# Patient Record
Sex: MALE | Race: WHITE | NOT HISPANIC OR LATINO | Employment: FULL TIME | URBAN - METROPOLITAN AREA
[De-identification: names, ages, dates, MRNs, and addresses within clinical notes are randomized per-mention and may not be internally consistent; named-entity substitution may affect disease eponyms.]

---

## 2017-01-06 ENCOUNTER — HOSPITAL ENCOUNTER (EMERGENCY)
Facility: HOSPITAL | Age: 27
Discharge: HOME | End: 2017-01-06
Attending: INTERNAL MEDICINE

## 2017-01-06 VITALS
WEIGHT: 170 LBS | OXYGEN SATURATION: 98 % | DIASTOLIC BLOOD PRESSURE: 75 MMHG | RESPIRATION RATE: 17 BRPM | TEMPERATURE: 97 F | HEART RATE: 84 BPM | BODY MASS INDEX: 24.34 KG/M2 | HEIGHT: 70 IN | SYSTOLIC BLOOD PRESSURE: 120 MMHG

## 2017-01-06 DIAGNOSIS — T50.904A DRUG OVERDOSE, UNDETERMINED INTENT, INITIAL ENCOUNTER: Primary | ICD-10-CM

## 2017-01-06 PROBLEM — T50.901A DRUG OVERDOSE: Status: ACTIVE | Noted: 2017-01-06

## 2017-01-06 LAB
AMPHET+METHAMPHET UR QL: NEGATIVE
APAP SERPL-MCNC: <3 UG/ML
BARBITURATES UR QL SCN>200 NG/ML: NEGATIVE
BENZODIAZ UR QL SCN>200 NG/ML: NEGATIVE
BZE UR QL SCN: NEGATIVE
CANNABINOIDS UR QL SCN: NORMAL
CREAT UR-MCNC: 153.8 MG/DL
ETHANOL SERPL-MCNC: <10 MG/DL
METHADONE UR QL SCN>300 NG/ML: NEGATIVE
OPIATES UR QL SCN: NEGATIVE
PCP UR QL SCN>25 NG/ML: NEGATIVE
SALICYLATES SERPL-MCNC: <5 MG/DL
TOXICOLOGY INFORMATION: NORMAL

## 2017-01-06 PROCEDURE — 99284 EMERGENCY DEPT VISIT MOD MDM: CPT | Mod: 25

## 2017-01-06 PROCEDURE — 80320 DRUG SCREEN QUANTALCOHOLS: CPT

## 2017-01-06 PROCEDURE — 25000003 PHARM REV CODE 250: Performed by: INTERNAL MEDICINE

## 2017-01-06 PROCEDURE — 80307 DRUG TEST PRSMV CHEM ANLYZR: CPT

## 2017-01-06 PROCEDURE — 82570 ASSAY OF URINE CREATININE: CPT

## 2017-01-06 PROCEDURE — 96360 HYDRATION IV INFUSION INIT: CPT

## 2017-01-06 PROCEDURE — 80329 ANALGESICS NON-OPIOID 1 OR 2: CPT

## 2017-01-06 RX ORDER — SODIUM CHLORIDE 9 MG/ML
1000 INJECTION, SOLUTION INTRAVENOUS
Status: COMPLETED | OUTPATIENT
Start: 2017-01-06 | End: 2017-01-06

## 2017-01-06 RX ADMIN — SODIUM CHLORIDE 1000 ML: 0.9 INJECTION, SOLUTION INTRAVENOUS at 09:01

## 2017-01-06 NOTE — LETTER
January 22, 2017    Haile France  [address] [place] [address]             [facility] -   [address]  [facility] [address]  Phone: [phone]  Fax: [phone] Dear Mr. France:        If you have any questions or concerns, please don't hesitate to call.    Sincerely,        John Orozco MD

## 2017-01-06 NOTE — ED AVS SNAPSHOT
"          [facility]  [address] [address]               Haile France   2017  8:44 PM   ED    Description:  Male : 1990   Department:  [facility]           Your Care was Coordinated By:     Provider Role From To    John Orozco MD Attending Provider 17 0022 --      Reason for Visit     Drug Overdose           Diagnoses this Visit        Comments    Drug overdose, undetermined intent, initial encounter    -  Primary       ED Disposition     None           To Do List           [facility]     [facility] -  Assistance  Registered nurses in the [facility] provide clinical advisement, health education, appointment booking, and other advisory services.  Call for this free service at [phone].             Medications           Message regarding Medications     Verify the changes and/or additions to your medication regime listed below are the same as discussed with your clinician today.  If any of these changes or additions are incorrect, please notify your healthcare provider.        These medications were administered today        Dose Freq    0.9%  NaCl infusion 1,000 mL ED 1 Time    Sig: Inject 1,000 mLs into the vein ED 1 Time.    Class: Normal    Route: Intravenous           Verify that the below list of medications is an accurate representation of the medications you are currently taking.  If none reported, the list may be blank. If incorrect, please contact your healthcare provider. Carry this list with you in case of emergency.           Current Medications     naproxen (NAPROSYN) 500 MG tablet Take 1 tablet (500 mg total) by mouth 2 (two) times daily with meals.           Clinical Reference Information           Your Vitals Were     BP Pulse Temp Resp Height Weight    120/75 (BP Location: Right arm) 84 97.3 °F (36.3 °C) (Oral) 17 5' 10" (1.778 m) 77.1 kg (170 lb)    " SpO2 BMI             98% 24.39 kg/m2         Allergies as of 1/6/2017     No Known Allergies      Immunizations Administered on Date of Encounter - 1/6/2017     None      ED Micro, Lab, POCT     Start Ordered       Status Ordering Provider    01/06/17 2047 01/06/17 2047  Drug screen panel, emergency  STAT      Final result     01/06/17 2047 01/06/17 2047  Ethanol  Once      Final result     01/06/17 2047 01/06/17 2047  Acetaminophen level  Once      Final result     01/06/17 2047 01/06/17 2047  Salicylate level  Once      Final result       ED Imaging Orders     None      Discharge References/Attachments     DRUG ABUSE (ENGLISH)      [facility] Sign-Up     Activating your [facility] account is as easy as 1-2-3!     1) Visit [email or web address], select Sign Up Now, enter this activation code and your date of birth, then select Next.  [email or web address]  Expires: 2/20/2017 10:41 PM      2) Create a username and password to use when you visit [facility] in the future and select a security question in case you lose your password and select Next.    3) Enter your e-mail address and click Sign Up!    Additional Information  If you have questions, please e-mail [email or web address] or call [phone] to talk to our [facility] staff. Remember, [facility] is NOT to be used for urgent needs. For medical emergencies, dial 911.         Smoking Cessation     If you would like to quit smoking:   You may be eligible for free services if you are a [place] resident and started smoking cigarettes before September 1, 1988.  Call the [employer] ([employer]) toll free at (180) 718-7580 or (290) 205-9579.   Call 2-800-QUIT-NOW if you do not meet the above criteria.             [facility]  complies with applicable Federal civil rights laws and does not discriminate on the basis of race, color, national origin, age, disability, or sex.        Language Assistance Services     ATTENTION: Language assistance services are  available, free of charge. Please call [phone].      ATENCIÓN: Si habla español, tiene a jung disposición servicios gratuitos de asistencia lingüística. Llame al [phone].     CHÚ Ý: N?u b?n nói Ti?ng Vi?t, có các d?ch v? h? tr? ngôn ng? mi?n phí dành cho b?n. G?i s? [phone].

## 2017-01-07 NOTE — ED NOTES
Pt to Ed from home where ray called 911 due to pt being unresponsive, pt reports taking 1 Roxycontin and going to sleep. Patient denies other drug/alcohol use at this time.

## 2017-02-10 ENCOUNTER — HOSPITAL ENCOUNTER (EMERGENCY)
Facility: HOSPITAL | Age: 27
Discharge: HOME | End: 2017-02-11
Attending: EMERGENCY MEDICINE

## 2017-02-10 DIAGNOSIS — R09.02 HYPOXIA: ICD-10-CM

## 2017-02-10 DIAGNOSIS — F11.129 OPIOID ABUSE WITH INTOXICATION: Primary | ICD-10-CM

## 2017-02-10 DIAGNOSIS — F19.10 POLYSUBSTANCE ABUSE: ICD-10-CM

## 2017-02-10 LAB
ALBUMIN SERPL BCP-MCNC: 4 G/DL
ALP SERPL-CCNC: 83 U/L
ALT SERPL W/O P-5'-P-CCNC: 17 U/L
ANION GAP SERPL CALC-SCNC: 13 MMOL/L
APAP SERPL-MCNC: <3 UG/ML
AST SERPL-CCNC: 17 U/L
BILIRUB SERPL-MCNC: 0.2 MG/DL
BUN SERPL-MCNC: 21 MG/DL
CALCIUM SERPL-MCNC: 8.6 MG/DL
CHLORIDE SERPL-SCNC: 103 MMOL/L
CO2 SERPL-SCNC: 24 MMOL/L
CREAT SERPL-MCNC: 1.2 MG/DL
EST. GFR  (AFRICAN AMERICAN): >60 ML/MIN/1.73 M^2
EST. GFR  (NON AFRICAN AMERICAN): >60 ML/MIN/1.73 M^2
ETHANOL SERPL-MCNC: <10 MG/DL
GLUCOSE SERPL-MCNC: 234 MG/DL
POTASSIUM SERPL-SCNC: 4.6 MMOL/L
PROT SERPL-MCNC: 7 G/DL
SODIUM SERPL-SCNC: 140 MMOL/L
TSH SERPL DL<=0.005 MIU/L-ACNC: 0.61 UIU/ML

## 2017-02-10 PROCEDURE — 80053 COMPREHEN METABOLIC PANEL: CPT

## 2017-02-10 PROCEDURE — 82570 ASSAY OF URINE CREATININE: CPT

## 2017-02-10 PROCEDURE — 85027 COMPLETE CBC AUTOMATED: CPT

## 2017-02-10 PROCEDURE — 80320 DRUG SCREEN QUANTALCOHOLS: CPT

## 2017-02-10 PROCEDURE — 80329 ANALGESICS NON-OPIOID 1 OR 2: CPT

## 2017-02-10 PROCEDURE — 99284 EMERGENCY DEPT VISIT MOD MDM: CPT

## 2017-02-10 PROCEDURE — 85007 BL SMEAR W/DIFF WBC COUNT: CPT

## 2017-02-10 PROCEDURE — 81000 URINALYSIS NONAUTO W/SCOPE: CPT

## 2017-02-10 PROCEDURE — 84443 ASSAY THYROID STIM HORMONE: CPT

## 2017-02-10 NOTE — ED AVS SNAPSHOT
[facility]  [address] [facility] [address]               Haile France   2/10/2017 10:21 PM   ED    Description:  Male : 1990   Department:  [facility]           Your Care was Coordinated By:     Provider Role From To    Yeison Lu Jr., MD Attending Provider 02/10/17 2224 --      Reason for Visit     Drug / Alcohol Assessment           Diagnoses this Visit        Comments    Opioid abuse with intoxication    -  Primary     Polysubstance abuse         Hypoxia           ED Disposition     ED Disposition Condition Comment    Discharge  Patient was advised that the treatment of chronic pain, as opposed to the treatment of acute pain, was beyond my scope of practice and was considered a non-emergent, not life threatening, condition. As per LAC 46:XLVII.4513.D.2.b, the practitioner will n ot prescribe controlled substances in connection with the treatment of chronic or intractable pain defined by LAC 46:XLV.5522-2294, as pain which persists beyond the usual course of a disease, beyond the expected time for healing from bodily trauma, or p ain associated with a long-term incurable or intractable medical illness and/or disease. Furthermore, the patient was advised that pursuant to [place] §971, it is considered unlawful for an individual to knowingly or intentionally acquire or obtain possess ion of a controlled dangerous substance by misrepresentation, fraud, forgery, deception, or subterfuge; to obtain or attempt to obtain a prescription for a controlled substance and/or legend drug by fraud, theft, misrepresentation, deception or subterfug e; or obtain or seek to obtain any controlled dangerous substance or a prescription for a controlled dangerous substance from a healthcare practitioner, while being supplied with any controlled dangerous substance or a prescription for a controlled dange kolby substance by another healthcare  practitioner, without disclosing the fact of the existing prescription to the practitioner from whom the subsequent prescription for a controlled dangerous substance is sought.     Regarding DRUG ADDICTION, the martyen t was advised that:  an overdose of controlled substances, illegal drugs, or narcotics, can lead to a coma and death; HIV, AIDS, and hepatitis B and C are serious diseases that can be spread through needles, syringes, and other supplies used to inject na rcotics; one may also get an infection if injecting narcotics using dirty needles or supplies; illegal narcotics may be mixed with things like talcum powder, baking soda, or poisons that may get stuck in veins and cause infections or clots that may trave l to the heart, lungs, or brain and cause death; snorting or sniffing heroin may cause a hole to develop in the cartilage that separates the two sides of the nose; using pure heroin  increases the risks of an overdose and death; and black tar heroin may  contain the bacteria that causes botulism which can be life-threatening.             To Do List           Follow-up Information     Follow up with [facility]. Schedule an appointment as soon as possible for a visit in 1 week.    Contact information:    [address] [facility] [address] [phone]          Follow up with [facility]    Specialty:  Emergency Medicine    Why:  As needed, If symptoms worsen    Contact information:    [address] [facility] [address] [phone]      [facility]     [facility] - 24/7 Assistance  Registered nurses in the [facility] provide clinical advisement, health education, appointment booking, and other advisory services.  Call for this free service at [phone].             Medications           Message regarding Medications     Verify the changes and/or additions to your medication regime listed below  "are the same as discussed with your clinician today.  If any of these changes or additions are incorrect, please notify your healthcare provider.        STOP taking these medications     naproxen (NAPROSYN) 500 MG tablet Take 1 tablet (500 mg total) by mouth 2 (two) times daily with meals.           Verify that the below list of medications is an accurate representation of the medications you are currently taking.  If none reported, the list may be blank. If incorrect, please contact your healthcare provider. Carry this list with you in case of emergency.                Clinical Reference Information           Your Vitals Were     BP Pulse Temp Resp Height Weight    104/67 94 96.1 °F (35.6 °C) (Axillary) 14 5' 8" (1.727 m) 85.7 kg (189 lb)    SpO2 BMI             96% 28.74 kg/m2         Allergies as of 2/11/2017     No Known Allergies      Immunizations Administered on Date of Encounter - 2/11/2017     None      ED Micro, Lab, POCT     Start Ordered       Status Ordering Provider    02/10/17 2240 02/10/17 2239  CBC auto differential  STAT      Final result     02/10/17 2240 02/10/17 2239  Comprehensive metabolic panel  STAT      Final result     02/10/17 2240 02/10/17 2239  TSH  STAT      Final result     02/10/17 2240 02/10/17 2239  Urinalysis - clean catch  STAT      Final result     02/10/17 2240 02/10/17 2239  Drug screen panel, emergency  STAT      Final result     02/10/17 2240 02/10/17 2239  Ethanol  STAT      Final result     02/10/17 2240 02/10/17 2239  Acetaminophen level  STAT      Final result     02/10/17 2239 02/10/17 2239  Urinalysis Microscopic  Once      Final result       ED Imaging Orders     None        Discharge Instructions         Alcohol Addiction  Are you addicted to alcohol?    You may be addicted to alcohol if your drinking harms yourself or others or leads to other problems with your daily life.  The medical term for this is alcohol use disorder. Your health care provider may diagnose you " with this disorder if you have at least two of the following problems within the span of a year:  · You drink alcohol in larger amounts or for a longer period than you intended.  · You frequently want to cut down or control alcohol use, or have frequently failed efforts to do so.  · You spend a lot of time getting alcohol, using alcohol, or recovering from alcohol use.  · You crave or have a strong desire or urge to drink alcohol.  · Ongoing alcohol use makes it hard for you to be responsible at work, school, or home.  · You continue to use alcohol even though you have had problems in relationships or social settings because of it.  · You give up or miss important social, work, or recreational activities because of alcohol use.  · You drink alcohol in situations in which it is physically unsafe, such as driving while intoxicated.  · You continue to drink alcohol despite knowing it has caused physical or emotional problems.  · You need more and more alcohol to get the same effects.  · You have withdrawal symptoms or use alcohol to avoid withdrawal symptoms.  Date Last Reviewed: 11/11/2014  © 8092-5466 [employer]. [address] [place] [address]. All rights reserved. This information is not intended as a substitute for professional medical care. Always follow your healthcare professional's instructions.          Signs of Addiction: Social Use  Not everyone who takes a drink or tries a drug has a substance abuse problem. But for some people, what starts as social use can lead to problem use (abuse) and then addiction. Some people can have a drink or use another substance and then stop. They dont think about drinking or using again for a while. For some this will never lead to heavier use. For others, it may.    You can take it or leave it  · You have a drink at a party or a glass of wine with dinner.  · You tried a street drug a few times, but have no interest in using it repeatedly.   · You  take medications only when needed and only as directed.  · You dont think about using or plan the next time youll use.  Using is still a choice  A social user can choose to say yes or no at any time. This person is in control of his or her substance use. Think about these statements:  · I can put down a drink without finishing it.  · I can enjoy myself at a social event without using.  · I can go without using or thinking about using.  If this describes you, youre most likely a social user. But ask yourself, can you really take it or leave it? You might try for a few weeks or even a month. If you find it hard or you just cant do it, maybe you need to think about your use again.  Date Last Reviewed: 11/11/2014 © 2000-2016 [employer]. [address] [place] [address]. All rights reserved. This information is not intended as a substitute for professional medical care. Always follow your healthcare professional's instructions.          Recovering from Addiction: Coping with Relapse  Drug and alcohol abuse changes the brain in ways that continue long after the abuse ends. Because of this, people who are addicted to drugs or alcohol are at risk for relapse. This is true even after long periods of staying drug- or alcohol-free. Like other chronic diseases, substance addiction needs to be managed daily. A person who is addicted to drugs or alcohol needs a plan to help prevent, or manage, a relapse.  You will need ongoing treatment and support. Your best chance for long-term recovery will likely be a combination of medicines and behavioral therapy. Other tips include:  Stick with your treatment plan. It may seem like you've recovered and you don't need to keep taking steps to stay drug- or alcohol-free. Your chances of staying sober are much higher if you continue treatment after you recover.  Get help immediately if you use the drug again. If you start using again, talk with your health care  provider or someone else who can help you right away.  Get loved ones involved in your recovery. A form of therapy called community reinforcement and family training focuses on counseling and training for your family. The therapist teaches your family how to help motivate you to seek treatment. This therapy also helps the family recognize situations that may lead you to drink or use drugs. Other family oriented recovery programs, such as Alcoholics Anonymous and Al-Anon, are available in communities nationwide.  Learn healthy ways to cope with stress, anger, boredom, or other triggers. Behavioral therapy can help you learn ways for coping with drug or alcohol cravings. It can also teach you ways to avoid drugs and prevent relapse, and help you deal with relapse if it occurs.   Date Last Reviewed: 11/12/2014 © 2000-2016 [employer]. [address] [place] [address], [place] [address]. All rights reserved. This information is not intended as a substitute for professional medical care. Always follow your healthcare professional's instructions.          Addiction: Ask Yourself These Questions  Ask yourself the following questions. The answers can help you see where you might have problems caused by substance abuse. Then you can decide whether youre ready to do something about your use.  Yes No    ? ? Do you ever have trouble remembering things or concentrating on what youre doing because youre thinking about using?   ? ? Have you ever broken promises because of your substance use?   ? ? Have you ever done things when you were using that you wouldnt normally do?   ? ? Have you ever lost a job or had money troubles because of your use?   ? ? Do you ever make excuses for your use or lie to hide it?   ? ? Does someone make excuses for you when youre hung over?   ? ? Do others ever seem embarrassed about your use?   ? ? Have your children ever asked you to stop using or been afraid of you when you are using?   ? ?  Has your spouse or a girlfriend or boyfriend ever left you because of your use?   ? ? Have you ever had sexual problems that might be caused by your use?   ? ? Have you ever had severe shaking, heard voices, or thought you were seeing things after youve been using?   ? ? Have you noticed that youve gotten sick more often as you use more?   ? ? Have you tried to quit but found you just couldnt?   If you answer Yes to one or more of these questions, you may need to change or stop your substance use.   Date Last Reviewed: 6/1/2016 © 2000-2016 [employer]. [address] [place] [address], [place] [address]. All rights reserved. This information is not intended as a substitute for professional medical care. Always follow your healthcare professional's instructions.          Opiate Abuse  Use and abuse of heroin or prescription pain medicines (Vicodin, Percocet, oxycodone, OxyContin, codeine, etc.) may lead to addiction or dependence. Once this occurs, you are at greater risk for any of these:  · Craving for the drug and unable to stop using the drug even though you think you want to stop (psychological addiction)  · Drug withdrawal symptoms if you stop taking the drug (physical dependence)  · Loss of your job or your family  · Arrest, conviction, and FDC sentence for possession of an illegal substance or for driving under the influence of such a substance  · Accidental injuries to yourself or others while you are under the influence of the drug in a car or at home  · Death or serious injury from overdose  Health problems  The list of potential health problems is a long one. It can be different with different medicines. They can cause problems even if you have no history of medical problems. It is also affected by other medicines you may be taking, and chronic illnesses you may have. Besides the problems listed above, abuse also has other effects, some directly related to the drugs, others from or related to  addiction or dependency.  · Anxiety  · Seizures  · Constipation  · Hepatitis (liver infection)  · Liver failure  · Blood pressure problems  · Depression  · Insomnia  · Nausea, vomiting, and stomach problems  · Drowsiness  · Slurred speech  · Trouble breathing  · Dizziness  · Skin infections  · Muscle pain and spasms  · Stroke  · Heart attack  · Kidney failure  · HIV infection  · Skin infections  · Other sexually transmitted diseases  · Severe and fatal infection of the heart valves  · Coma and death  Home care  · Admit you have a drug problem. Ask for help from your family and close friends.  · Seek professional help. This could be individual psychotherapy, counseling, or a drug treatment program (outpatient or residential).  · Join a self-help group for drug abuse.  · Avoid friends who abuse drugs themselves or tempt you to continue your habit.  · Eat a balanced diet and begin a regular exercise program.  Follow-up care  Follow up with your healthcare provider, or as advised. Contact one of the resources below for help:  · National [place] on Alcoholism and Drug Dependence, www.ncadd.org 800-475-HOPE  · Narcotics Anonymous. Check your phone book for a local listing, call 907-383-8303, or visit www.[employer].org.  · National Alcohol and Substance Abuse Information Center for referral to treatment programs  · www.[employer] 178-366-1941  Call 917  Call 911 if any of these occur:   · Seizure  · Trouble breathing or slow, irregular breathing  · Chest pain  · Sudden weakness on one side of your body or sudden trouble speaking  · Very drowsy or trouble awakening  · Fainting or loss of consciousness  · Rapid heart rate  · Very slow heart rate  When to seek medical advice  Call your healthcare provider right away if any of these occur:  · Symptoms of withdrawal. These include agitation, anxiety, trembling, sweats, diarrhea, unable to sleep.  · Unexplained fever over 100.4º F (38.0º C)  · Excessive drowsiness or  inability to be awakened  · Redness, swelling, or tenderness at an injection site  Date Last Reviewed: 1/11/2016  © 7708-7883 [employer]. [address] [place] [address], [place] [address]. All rights reserved. This information is not intended as a substitute for professional medical care. Always follow your healthcare professional's instructions.          Treating Drug Abuse and Addiction  Treatment for addiction to drugs varies with your needs. Some people go through treatment only once. Others return to it off and on throughout their lives.    Recovery is a lifelong process  Recovery begins when you seek help for your drug abuse or addiction. Then, youll slowly start to build a new life. It may not always be easy. But with the support of others, you can succeed. During recovery, youll go through three stages. How long each one lasts varies with each person.  Early recovery  During this stage, youll focus on stopping your drug abuse or addiction. Most likely, youll receive help from a therapist, addiction counselor, or doctor. You may also go to self-help groups on a regular basis. Youll avoid people or places that might tempt you to use drugs.  Middle recovery  During this time, youll work on changing your life. You may change your values, move, or go back to school. You might start new, healthy relationships. And you might end ones that arent as healthy. You may even try to make up for harm you caused others while using drugs.  Late recovery  This stage will last for the rest of your life. Youre feeling stronger and healthier. Now, you may look for a greater sense of purpose. You may focus on the things that matter to you most. These may include your family, your beliefs, or lending a hand to others.  Types of drug treatment  · Residential treatment. You live in a drug-free setting with others who have the same problem. Often, your stay in community residential treatment lasts about a month, but it  could last up to 6 months. During this time, you see a therapist or addiction counselor.  · Outpatient therapy. You see a therapist, or addiction counselor while living your normal life. You may see your therapist by yourself. Or you may be part of a group. In some cases, your family may see your therapist too.  · Self-help groups. These offer you support and encouragement. There are also support groups for the loved ones of people addicted to drugs.  · Medications. Your treatment may include certain medications, such as methadone, disulfiram, buprenorphine, or naltrexone.  · Alternative treatments. These may include acupuncture, hypnosis, or biofeedback. Ask your health care provider about them.  When times get tough  Drug addiction is never really cured. Sometimes, no matter how well youre doing, you may be tempted. If so, you can:  · Call your sponsor. This is someone in your self-help group who watches out for you.  · Talk to your therapist, health care provider, or someone else you trust.  · Make a list of how much youve achieved.  · Find something to distract you. Go to a movie, go out for a walk, or call a friend.  Date Last Reviewed: 11/18/2014  © 7122-8578 [employer]. [address] [place] [address], [place] [address]. All rights reserved. This information is not intended as a substitute for professional medical care. Always follow your healthcare professional's instructions.          Opioid Withdrawal  Opioid withdrawal occurs in people who have used opioids on a daily basis for at least 3 weeks. Symptoms usually begin about 12 hours after the last dose of the opioid. Withdrawal symptoms last from 3 to 5 days and may include yawning, sweating, runny nose, restlessness, stomach cramping, diarrhea, nausea, vomiting, hot and cold flashes, and difficulty sleeping.  Home care  The treatment for withdrawal is mostly managing the symptoms without making the problem worse. Follow these guidelines when  caring for yourself at home:  · Stay with someone who can help you and give you emotional support during this time. Resist the temptation to take more of the addicting drug to stop your symptoms.  · If you have stomach cramps, nausea, or vomiting, take only clear liquids until the symptoms improve. Adults should drink a total of 2 to 3 quarts of liquid daily. It is best to take small frequent drinks rather than a few large ones. You may consume liquids in any of the following forms: mineral water, apple juice, sports drinks, soft drinks without caffeine, clear broth soups, plain gelatin, and ice pops.  · Avoid alcohol, caffeine, and tobacco during this time.  · Take any medicines prescribed for nausea or cramping exactly as directed.  · If you were given a clonidine patch, leave this on for 7 days. You may remove it sooner if you develop excess dizziness or drowsiness.  Follow-up care  Follow up with your health care provider if you need further symptom control, or as advised. When you are through withdrawal, take the opportunity to enter a treatment program. This may help you stay off the addicting drug.  When to seek medical advice  Call your health care provider right away if any of these occur:  · Fever of 100.4ºF (38ºC) or higher, or as directed by your health care provider  · Inability to keep down liquids for 8 hours  · Frequent diarrhea  · Signs of infection at the site of IV needle use (redness, warmth, pain, or swelling)  Call 911  Call emergency services right away if any of these occur:  · Trouble breathing or swallowing, or wheezing  · Severe confusion  · Extreme drowsiness or trouble awakening  · Fainting or loss of consciousness  · Rapid heart rate or very slow heart rate  · Very low or very high blood pressure  · Vomiting blood, or large amounts of blood in stool  · Seizure  Date Last Reviewed: 4/28/2015  © 2133-6878 [employer]. [address] [place] [address], [place] [address]. All rights  reserved. This information is not intended as a substitute for professional medical care. Always follow your healthcare professional's instructions.          [facility] Sign-Up     Activating your [facility] account is as easy as 1-2-3!     1) Visit [email or web address], select Sign Up Now, enter this activation code and your date of birth, then select Next.  [email or web address]  Expires: 2/20/2017 10:41 PM      2) Create a username and password to use when you visit [facility] in the future and select a security question in case you lose your password and select Next.    3) Enter your e-mail address and click Sign Up!    Additional Information  If you have questions, please e-mail [email or web address] or call [phone] to talk to our [facility] staff. Remember, [facility] is NOT to be used for urgent needs. For medical emergencies, dial 911.         Smoking Cessation     If you would like to quit smoking:   You may be eligible for free services if you are a [place] resident and started smoking cigarettes before September 1, 1988.  Call the Smoking Cessation Trust (SCT) toll free at (151) 681-9971 or (084) 649-7440.   Call 6-813-QUIT-NOW if you do not meet the above criteria.             [facility] complies with applicable Federal civil rights laws and does not discriminate on the basis of race, color, national origin, age, disability, or sex.        Language Assistance Services     ATTENTION: Language assistance services are available, free of charge. Please call [phone].      ATENCIÓN: Si habla español, tiene a jung disposición servicios gratuitos de asistencia lingüística. Llame al [phone].     CHÚ Ý: N?u b?n nói Ti?ng Vi?t, có các d?ch v? h? tr? ngôn ng? mi?n phí dành cho b?n. G?i s? [phone].

## 2017-02-11 VITALS
BODY MASS INDEX: 28.64 KG/M2 | RESPIRATION RATE: 16 BRPM | HEART RATE: 87 BPM | TEMPERATURE: 98 F | HEIGHT: 68 IN | OXYGEN SATURATION: 98 % | DIASTOLIC BLOOD PRESSURE: 68 MMHG | WEIGHT: 189 LBS | SYSTOLIC BLOOD PRESSURE: 108 MMHG

## 2017-02-11 PROBLEM — F11.129 OPIOID ABUSE WITH INTOXICATION: Status: ACTIVE | Noted: 2017-02-11

## 2017-02-11 PROBLEM — R09.02 HYPOXIA: Status: ACTIVE | Noted: 2017-02-11

## 2017-02-11 LAB
AMPHET+METHAMPHET UR QL: NEGATIVE
BACTERIA #/AREA URNS HPF: ABNORMAL /HPF
BARBITURATES UR QL SCN>200 NG/ML: NEGATIVE
BASOPHILS # BLD AUTO: ABNORMAL K/UL
BASOPHILS NFR BLD: 0 %
BENZODIAZ UR QL SCN>200 NG/ML: NEGATIVE
BILIRUB UR QL STRIP: NEGATIVE
BZE UR QL SCN: NEGATIVE
CANNABINOIDS UR QL SCN: NORMAL
CLARITY UR: CLEAR
COLOR UR: YELLOW
CREAT UR-MCNC: 122.2 MG/DL
DIFFERENTIAL METHOD: ABNORMAL
EOSINOPHIL # BLD AUTO: ABNORMAL K/UL
EOSINOPHIL NFR BLD: 0 %
ERYTHROCYTE [DISTWIDTH] IN BLOOD BY AUTOMATED COUNT: 12.4 %
GLUCOSE UR QL STRIP: ABNORMAL
HCT VFR BLD AUTO: 40.3 %
HGB BLD-MCNC: 13.8 G/DL
HGB UR QL STRIP: NEGATIVE
HYALINE CASTS #/AREA URNS LPF: 3 /LPF
KETONES UR QL STRIP: NEGATIVE
LEUKOCYTE ESTERASE UR QL STRIP: NEGATIVE
LYMPHOCYTES # BLD AUTO: ABNORMAL K/UL
LYMPHOCYTES NFR BLD: 3 %
MCH RBC QN AUTO: 30.5 PG
MCHC RBC AUTO-ENTMCNC: 34.2 %
MCV RBC AUTO: 89 FL
METHADONE UR QL SCN>300 NG/ML: NEGATIVE
MICROSCOPIC COMMENT: ABNORMAL
MONOCYTES # BLD AUTO: ABNORMAL K/UL
MONOCYTES NFR BLD: 2 %
NEUTROPHILS NFR BLD: 79 %
NEUTS BAND NFR BLD MANUAL: 16 %
NITRITE UR QL STRIP: NEGATIVE
OPIATES UR QL SCN: NEGATIVE
PCP UR QL SCN>25 NG/ML: NEGATIVE
PH UR STRIP: 6 [PH] (ref 5–8)
PLATELET # BLD AUTO: 205 K/UL
PLATELET BLD QL SMEAR: ABNORMAL
PMV BLD AUTO: 9.2 FL
PROT UR QL STRIP: NEGATIVE
RBC # BLD AUTO: 4.53 M/UL
SP GR UR STRIP: >=1.03 (ref 1–1.03)
TOXICOLOGY INFORMATION: NORMAL
URN SPEC COLLECT METH UR: ABNORMAL
UROBILINOGEN UR STRIP-ACNC: NEGATIVE EU/DL
WBC # BLD AUTO: 16.5 K/UL
YEAST URNS QL MICRO: ABNORMAL

## 2017-02-11 NOTE — ED NOTES
Pt provided with urinal. Instructed on need for sample. Pt informed to alert staff when urine sample available. Pt verbalizes understanding.

## 2017-02-11 NOTE — DISCHARGE INSTRUCTIONS
Alcohol Addiction  Are you addicted to alcohol?    You may be addicted to alcohol if your drinking harms yourself or others or leads to other problems with your daily life.  The medical term for this is alcohol use disorder. Your health care provider may diagnose you with this disorder if you have at least two of the following problems within the span of a year:  · You drink alcohol in larger amounts or for a longer period than you intended.  · You frequently want to cut down or control alcohol use, or have frequently failed efforts to do so.  · You spend a lot of time getting alcohol, using alcohol, or recovering from alcohol use.  · You crave or have a strong desire or urge to drink alcohol.  · Ongoing alcohol use makes it hard for you to be responsible at work, school, or home.  · You continue to use alcohol even though you have had problems in relationships or social settings because of it.  · You give up or miss important social, work, or recreational activities because of alcohol use.  · You drink alcohol in situations in which it is physically unsafe, such as driving while intoxicated.  · You continue to drink alcohol despite knowing it has caused physical or emotional problems.  · You need more and more alcohol to get the same effects.  · You have withdrawal symptoms or use alcohol to avoid withdrawal symptoms.  Date Last Reviewed: 11/11/2014  © 2262-7266 [employer]. [address] [place] [address]. All rights reserved. This information is not intended as a substitute for professional medical care. Always follow your healthcare professional's instructions.          Signs of Addiction: Social Use  Not everyone who takes a drink or tries a drug has a substance abuse problem. But for some people, what starts as social use can lead to problem use (abuse) and then addiction. Some people can have a drink or use another substance and then stop. They dont think about drinking or using  again for a while. For some this will never lead to heavier use. For others, it may.    You can take it or leave it  · You have a drink at a party or a glass of wine with dinner.  · You tried a street drug a few times, but have no interest in using it repeatedly.   · You take medications only when needed and only as directed.  · You dont think about using or plan the next time youll use.  Using is still a choice  A social user can choose to say yes or no at any time. This person is in control of his or her substance use. Think about these statements:  · I can put down a drink without finishing it.  · I can enjoy myself at a social event without using.  · I can go without using or thinking about using.  If this describes you, youre most likely a social user. But ask yourself, can you really take it or leave it? You might try for a few weeks or even a month. If you find it hard or you just cant do it, maybe you need to think about your use again.  Date Last Reviewed: 11/11/2014 © 2000-2016 [employer]. [address] [place] [address]. All rights reserved. This information is not intended as a substitute for professional medical care. Always follow your healthcare professional's instructions.          Recovering from Addiction: Coping with Relapse  Drug and alcohol abuse changes the brain in ways that continue long after the abuse ends. Because of this, people who are addicted to drugs or alcohol are at risk for relapse. This is true even after long periods of staying drug- or alcohol-free. Like other chronic diseases, substance addiction needs to be managed daily. A person who is addicted to drugs or alcohol needs a plan to help prevent, or manage, a relapse.  You will need ongoing treatment and support. Your best chance for long-term recovery will likely be a combination of medicines and behavioral therapy. Other tips include:  Stick with your treatment plan. It may seem like you've  recovered and you don't need to keep taking steps to stay drug- or alcohol-free. Your chances of staying sober are much higher if you continue treatment after you recover.  Get help immediately if you use the drug again. If you start using again, talk with your health care provider or someone else who can help you right away.  Get loved ones involved in your recovery. A form of therapy called community reinforcement and family training focuses on counseling and training for your family. The therapist teaches your family how to help motivate you to seek treatment. This therapy also helps the family recognize situations that may lead you to drink or use drugs. Other family oriented recovery programs, such as Alcoholics Anonymous and Al-Anon, are available in communities nationwide.  Learn healthy ways to cope with stress, anger, boredom, or other triggers. Behavioral therapy can help you learn ways for coping with drug or alcohol cravings. It can also teach you ways to avoid drugs and prevent relapse, and help you deal with relapse if it occurs.   Date Last Reviewed: 11/12/2014 © 2000-2016 [employer]. [address] [place] [address]. All rights reserved. This information is not intended as a substitute for professional medical care. Always follow your healthcare professional's instructions.          Addiction: Ask Yourself These Questions  Ask yourself the following questions. The answers can help you see where you might have problems caused by substance abuse. Then you can decide whether youre ready to do something about your use.  Yes No    ? ? Do you ever have trouble remembering things or concentrating on what youre doing because youre thinking about using?   ? ? Have you ever broken promises because of your substance use?   ? ? Have you ever done things when you were using that you wouldnt normally do?   ? ? Have you ever lost a job or had money troubles because of your use?   ? ? Do  you ever make excuses for your use or lie to hide it?   ? ? Does someone make excuses for you when youre hung over?   ? ? Do others ever seem embarrassed about your use?   ? ? Have your children ever asked you to stop using or been afraid of you when you are using?   ? ? Has your spouse or a girlfriend or boyfriend ever left you because of your use?   ? ? Have you ever had sexual problems that might be caused by your use?   ? ? Have you ever had severe shaking, heard voices, or thought you were seeing things after youve been using?   ? ? Have you noticed that youve gotten sick more often as you use more?   ? ? Have you tried to quit but found you just couldnt?   If you answer Yes to one or more of these questions, you may need to change or stop your substance use.   Date Last Reviewed: 6/1/2016  © 5414-7026 [employer]. [address] [place] [address]. All rights reserved. This information is not intended as a substitute for professional medical care. Always follow your healthcare professional's instructions.          Opiate Abuse  Use and abuse of heroin or prescription pain medicines (Vicodin, Percocet, oxycodone, OxyContin, codeine, etc.) may lead to addiction or dependence. Once this occurs, you are at greater risk for any of these:  · Craving for the drug and unable to stop using the drug even though you think you want to stop (psychological addiction)  · Drug withdrawal symptoms if you stop taking the drug (physical dependence)  · Loss of your job or your family  · Arrest, conviction, and [facility] sentence for possession of an illegal substance or for driving under the influence of such a substance  · Accidental injuries to yourself or others while you are under the influence of the drug in a car or at home  · Death or serious injury from overdose  Health problems  The list of potential health problems is a long one. It can be different with different medicines. They can cause  problems even if you have no history of medical problems. It is also affected by other medicines you may be taking, and chronic illnesses you may have. Besides the problems listed above, abuse also has other effects, some directly related to the drugs, others from or related to addiction or dependency.  · Anxiety  · Seizures  · Constipation  · Hepatitis (liver infection)  · Liver failure  · Blood pressure problems  · Depression  · Insomnia  · Nausea, vomiting, and stomach problems  · Drowsiness  · Slurred speech  · Trouble breathing  · Dizziness  · Skin infections  · Muscle pain and spasms  · Stroke  · Heart attack  · Kidney failure  · HIV infection  · Skin infections  · Other sexually transmitted diseases  · Severe and fatal infection of the heart valves  · Coma and death  Home care  · Admit you have a drug problem. Ask for help from your family and close friends.  · Seek professional help. This could be individual psychotherapy, counseling, or a drug treatment program (outpatient or residential).  · Join a self-help group for drug abuse.  · Avoid friends who abuse drugs themselves or tempt you to continue your habit.  · Eat a balanced diet and begin a regular exercise program.  Follow-up care  Follow up with your healthcare provider, or as advised. Contact one of the resources below for help:  · National [place] on Alcoholism and Drug Dependence, www.ncadd.org 800-475-HOPE  · Narcotics Anonymous. Check your phone book for a local listing, call 396-523-0577, or visit www.na.org.  · National Alcohol and Substance Abuse Information Center for referral to treatment programs  · www.[employer] 049-915-8012  Call 911  Call 911 if any of these occur:   · Seizure  · Trouble breathing or slow, irregular breathing  · Chest pain  · Sudden weakness on one side of your body or sudden trouble speaking  · Very drowsy or trouble awakening  · Fainting or loss of consciousness  · Rapid heart rate  · Very slow heart  rate  When to seek medical advice  Call your healthcare provider right away if any of these occur:  · Symptoms of withdrawal. These include agitation, anxiety, trembling, sweats, diarrhea, unable to sleep.  · Unexplained fever over 100.4º F (38.0º C)  · Excessive drowsiness or inability to be awakened  · Redness, swelling, or tenderness at an injection site  Date Last Reviewed: 1/11/2016 © 2000-2016 [employer]. [address] [place] [address], [place] [address]. All rights reserved. This information is not intended as a substitute for professional medical care. Always follow your healthcare professional's instructions.          Treating Drug Abuse and Addiction  Treatment for addiction to drugs varies with your needs. Some people go through treatment only once. Others return to it off and on throughout their lives.    Recovery is a lifelong process  Recovery begins when you seek help for your drug abuse or addiction. Then, youll slowly start to build a new life. It may not always be easy. But with the support of others, you can succeed. During recovery, youll go through three stages. How long each one lasts varies with each person.  Early recovery  During this stage, youll focus on stopping your drug abuse or addiction. Most likely, youll receive help from a therapist, addiction counselor, or doctor. You may also go to self-help groups on a regular basis. Youll avoid people or places that might tempt you to use drugs.  Middle recovery  During this time, youll work on changing your life. You may change your values, move, or go back to school. You might start new, healthy relationships. And you might end ones that arent as healthy. You may even try to make up for harm you caused others while using drugs.  Late recovery  This stage will last for the rest of your life. Youre feeling stronger and healthier. Now, you may look for a greater sense of purpose. You may focus on the things that matter to you  most. These may include your family, your beliefs, or lending a hand to others.  Types of drug treatment  · Residential treatment. You live in a drug-free setting with others who have the same problem. Often, your stay in community residential treatment lasts about a month, but it could last up to 6 months. During this time, you see a therapist or addiction counselor.  · Outpatient therapy. You see a therapist, or addiction counselor while living your normal life. You may see your therapist by yourself. Or you may be part of a group. In some cases, your family may see your therapist too.  · Self-help groups. These offer you support and encouragement. There are also support groups for the loved ones of people addicted to drugs.  · Medications. Your treatment may include certain medications, such as methadone, disulfiram, buprenorphine, or naltrexone.  · Alternative treatments. These may include acupuncture, hypnosis, or biofeedback. Ask your health care provider about them.  When times get tough  Drug addiction is never really cured. Sometimes, no matter how well youre doing, you may be tempted. If so, you can:  · Call your sponsor. This is someone in your self-help group who watches out for you.  · Talk to your therapist, health care provider, or someone else you trust.  · Make a list of how much youve achieved.  · Find something to distract you. Go to a movie, go out for a walk, or call a friend.  Date Last Reviewed: 11/18/2014  © 7883-3664 [employer]. [address] [place] [address], [place] [address]. All rights reserved. This information is not intended as a substitute for professional medical care. Always follow your healthcare professional's instructions.          Opioid Withdrawal  Opioid withdrawal occurs in people who have used opioids on a daily basis for at least 3 weeks. Symptoms usually begin about 12 hours after the last dose of the opioid. Withdrawal symptoms last from 3 to 5 days and may  include yawning, sweating, runny nose, restlessness, stomach cramping, diarrhea, nausea, vomiting, hot and cold flashes, and difficulty sleeping.  Home care  The treatment for withdrawal is mostly managing the symptoms without making the problem worse. Follow these guidelines when caring for yourself at home:  · Stay with someone who can help you and give you emotional support during this time. Resist the temptation to take more of the addicting drug to stop your symptoms.  · If you have stomach cramps, nausea, or vomiting, take only clear liquids until the symptoms improve. Adults should drink a total of 2 to 3 quarts of liquid daily. It is best to take small frequent drinks rather than a few large ones. You may consume liquids in any of the following forms: mineral water, apple juice, sports drinks, soft drinks without caffeine, clear broth soups, plain gelatin, and ice pops.  · Avoid alcohol, caffeine, and tobacco during this time.  · Take any medicines prescribed for nausea or cramping exactly as directed.  · If you were given a clonidine patch, leave this on for 7 days. You may remove it sooner if you develop excess dizziness or drowsiness.  Follow-up care  Follow up with your health care provider if you need further symptom control, or as advised. When you are through withdrawal, take the opportunity to enter a treatment program. This may help you stay off the addicting drug.  When to seek medical advice  Call your health care provider right away if any of these occur:  · Fever of 100.4ºF (38ºC) or higher, or as directed by your health care provider  · Inability to keep down liquids for 8 hours  · Frequent diarrhea  · Signs of infection at the site of IV needle use (redness, warmth, pain, or swelling)  Call 911  Call emergency services right away if any of these occur:  · Trouble breathing or swallowing, or wheezing  · Severe confusion  · Extreme drowsiness or trouble awakening  · Fainting or loss of  consciousness  · Rapid heart rate or very slow heart rate  · Very low or very high blood pressure  · Vomiting blood, or large amounts of blood in stool  · Seizure  Date Last Reviewed: 4/28/2015  © 8210-8786 [employer]. [address] [place] [address], [place] [address]. All rights reserved. This information is not intended as a substitute for professional medical care. Always follow your healthcare professional's instructions.

## 2017-02-11 NOTE — ED PROVIDER NOTES
"SCRIBE #1 NOTE: I, Danielle Idania, am scribing for, and in the presence of, Yeison Lu Jr., MD. I have scribed the entire note.      History      Chief Complaint   Patient presents with    Drug / Alcohol Assessment     " snorted opana" found unresponsive per police. given 2mg in route.        Review of patient's allergies indicates:  No Known Allergies     HPI   HPI    2/10/2017, 10:23 PM   History obtained from the patient and [facility]      History of Present Illness: Haile France is a 26 y.o. male patient who presents to the Emergency Department via [facility] for drug overdose. Sxs are constant and moderate in severity. Pt reports he snorted Opana 20mg around 6pm (4.5 hours PTA) Pt was found unresponsive in his vehicle. Pt admits to marijuana and etoh use tonight as well. Pt denies suicidal intention. Pt given 2mg Narcan en route.There are no mitigating or exacerbating factors noted.  Pt denies any fever, N/V/D, CP, SI, HI, hallucinations, and all other sxs at this time. No further complaints or concerns at this time.     Arrival mode: [facility]    PCP: Primary Doctor No       Past Medical History:  Past Medical History   Diagnosis Date    Drug abuse        Past Surgical History:  History reviewed. No pertinent past surgical history.      Family History:  History reviewed. No pertinent family history.    Social History:  Social History     Social History Main Topics    Smoking status: Current Every Day Smoker     Packs/day: 0.50     Types: Cigarettes    Smokeless tobacco: Not on file    Alcohol use No    Drug use: Yes      Comment: Opana, Marijuana    Sexual activity: Yes       ROS   Review of Systems   Constitutional: Negative for fever.        (+) overdose   HENT: Negative for sore throat.    Respiratory: Negative for shortness of breath.    Cardiovascular: Negative for chest pain.   Gastrointestinal: Negative for diarrhea, nausea and vomiting.   Genitourinary: Negative for dysuria.   Musculoskeletal: " Negative for back pain.   Skin: Negative for rash.   Neurological: Negative for weakness.   Hematological: Does not bruise/bleed easily.       Physical Exam      Initial Vitals   BP Pulse Resp Temp SpO2   -- 02/10/17 2224 02/10/17 2224 02/10/17 2224 02/10/17 2224    127 28 96.1 °F (35.6 °C) 93 %       Physical Exam  Nursing Notes and Vital Signs Reviewed.  Constitutional: Patient is in no acute distress. Awake and alert. Well-developed and well-nourished.  Head: Atraumatic. Normocephalic.  Eyes: PERRL. EOM intact. Conjunctivae are not pale. No scleral icterus.  ENT: Mucous membranes are moist. Oropharynx is clear and symmetric.    Neck: Supple. Full ROM. No lymphadenopathy.  Cardiovascular: Regular rate. Regular rhythm. No murmurs, rubs, or gallops. Distal pulses are 2+ and symmetric.  Pulmonary/Chest: No respiratory distress. Clear to auscultation bilaterally. No wheezing, rales, or rhonchi.  Abdominal: Soft and non-distended.  There is no tenderness.  No rebound, guarding, or rigidity.   Musculoskeletal: Moves all extremities. No obvious deformities. No edema. No calf tenderness.  Skin: Warm and dry.  Neurological: Patient is alert and oriented to person, place and time. Pupils ERRL and EOM normal. Cranial nerves II-XII are intact. Strength is full bilaterally; it is equal and 5/5 in bilateral upper and lower extremities. Speech is clear and normal. No acute focal neurological deficits noted.  Psychiatric: Normal affect. Good eye contact. Appropriate in content.    ED Course    Critical Care  Date/Time: 2/11/2017 2:35 AM  Performed by: BECCA JAVIER JR  Authorized by: BECCA JAVIER JR   Direct patient critical care time: 10 minutes  Additional history critical care time: 5 minutes  Ordering / reviewing critical care time: 5 minutes  Documentation critical care time: 5 minutes  Consulting other physicians critical care time: 0 minutes  Total critical care time (exclusive of procedural time) : 25 minutes  Critical  "care time was exclusive of separately billable procedures and treating other patients.  Critical care was necessary to treat or prevent imminent or life-threatening deterioration of the following conditions: hypoxia.  Critical care was time spent personally by me on the following activities: blood draw for specimens, discussions with consultants, evaluation of patient's response to treatment, obtaining history from patient or surrogate, ordering and review of laboratory studies, pulse oximetry, review of old charts, re-evaluation of patient's condition, ordering and review of radiographic studies, ordering and performing treatments and interventions, examination of patient, interpretation of cardiac output measurements and development of treatment plan with patient or surrogate.        ED Vital Signs:  Vitals:    02/10/17 2224 02/10/17 2236 02/10/17 2322 02/10/17 2340   BP:  138/87 121/68    Pulse: (!) 127 (!) 113 108    Resp: (!) 28 (!) 26 20    Temp: 96.1 °F (35.6 °C)      TempSrc: Axillary      SpO2: (!) 93% 100% 100% 100%   Weight: 85.7 kg (189 lb)      Height: 5' 8" (1.727 m)       02/10/17 2345 02/11/17 0045 02/11/17 0209 02/11/17 0258   BP: (!) 118/59 (!) 98/52 104/67 108/68   Pulse: 102 98 94 87   Resp: 16 16 14 16   Temp:    98.4 °F (36.9 °C)   TempSrc:       SpO2: 98% 95% 96% 98%   Weight:       Height:           Abnormal Lab Results:  Labs Reviewed   CBC W/ AUTO DIFFERENTIAL - Abnormal; Notable for the following:        Result Value    WBC 16.50 (*)     RBC 4.53 (*)     Hemoglobin 13.8 (*)     Gran% 79.0 (*)     Lymph% 3.0 (*)     Mono% 2.0 (*)     All other components within normal limits   COMPREHENSIVE METABOLIC PANEL - Abnormal; Notable for the following:     Glucose 234 (*)     BUN, Bld 21 (*)     Calcium 8.6 (*)     All other components within normal limits   URINALYSIS - Abnormal; Notable for the following:     Specific Gravity, UA >=1.030 (*)     Glucose, UA 3+ (*)     All other components within " normal limits   ACETAMINOPHEN LEVEL - Abnormal; Notable for the following:     Acetaminophen (Tylenol), Serum <3.0 (*)     All other components within normal limits   URINALYSIS MICROSCOPIC - Abnormal; Notable for the following:     Hyaline Casts, UA 3 (*)     All other components within normal limits   TSH   DRUG SCREEN PANEL, URINE EMERGENCY   ALCOHOL,MEDICAL (ETHANOL)        All Lab Results:  Results for orders placed or performed during the hospital encounter of 02/10/17   CBC auto differential   Result Value Ref Range    WBC 16.50 (H) 3.90 - 12.70 K/uL    RBC 4.53 (L) 4.60 - 6.20 M/uL    Hemoglobin 13.8 (L) 14.0 - 18.0 g/dL    Hematocrit 40.3 40.0 - 54.0 %    MCV 89 82 - 98 fL    MCH 30.5 27.0 - 31.0 pg    MCHC 34.2 32.0 - 36.0 %    RDW 12.4 11.5 - 14.5 %    Platelets 205 150 - 350 K/uL    MPV 9.2 9.2 - 12.9 fL    Lymph # CANCELED 1.0 - 4.8 K/uL    Mono # CANCELED 0.3 - 1.0 K/uL    Eos # CANCELED 0.0 - 0.5 K/uL    Baso # CANCELED 0.00 - 0.20 K/uL    Gran% 79.0 (H) 38.0 - 73.0 %    Lymph% 3.0 (L) 18.0 - 48.0 %    Mono% 2.0 (L) 4.0 - 15.0 %    Eosinophil% 0.0 0.0 - 8.0 %    Basophil% 0.0 0.0 - 1.9 %    Bands 16.0 %    Platelet Estimate Appears normal     Differential Method Manual    Comprehensive metabolic panel   Result Value Ref Range    Sodium 140 136 - 145 mmol/L    Potassium 4.6 3.5 - 5.1 mmol/L    Chloride 103 95 - 110 mmol/L    CO2 24 23 - 29 mmol/L    Glucose 234 (H) 70 - 110 mg/dL    BUN, Bld 21 (H) 6 - 20 mg/dL    Creatinine 1.2 0.5 - 1.4 mg/dL    Calcium 8.6 (L) 8.7 - 10.5 mg/dL    Total Protein 7.0 6.0 - 8.4 g/dL    Albumin 4.0 3.5 - 5.2 g/dL    Total Bilirubin 0.2 0.1 - 1.0 mg/dL    Alkaline Phosphatase 83 55 - 135 U/L    AST 17 10 - 40 U/L    ALT 17 10 - 44 U/L    Anion Gap 13 8 - 16 mmol/L    eGFR if African American >60 >60 mL/min/1.73 m^2    eGFR if non African American >60 >60 mL/min/1.73 m^2   TSH   Result Value Ref Range    TSH 0.610 0.400 - 4.000 uIU/mL   Urinalysis - clean catch   Result  Value Ref Range    Specimen UA Urine, Clean Catch     Color, UA Yellow Yellow, Straw, Valeri    Appearance, UA Clear Clear    pH, UA 6.0 5.0 - 8.0    Specific Gravity, UA >=1.030 (A) 1.005 - 1.030    Protein, UA Negative Negative    Glucose, UA 3+ (A) Negative    Ketones, UA Negative Negative    Bilirubin (UA) Negative Negative    Occult Blood UA Negative Negative    Nitrite, UA Negative Negative    Urobilinogen, UA Negative <2.0 EU/dL    Leukocytes, UA Negative Negative   Drug screen panel, emergency   Result Value Ref Range    Benzodiazepines Negative     Methadone metabolites Negative     Cocaine (Metab.) Negative     Opiate Scrn, Ur Negative     Barbiturate Screen, Ur Negative     Amphetamine Screen, Ur Negative     THC Presumptive Positive     Phencyclidine Negative     Creatinine, Random Ur 122.2 23.0 - 375.0 mg/dL    Toxicology Information SEE COMMENT    Ethanol   Result Value Ref Range    Alcohol, Medical, Serum <10 <10 mg/dL   Acetaminophen level   Result Value Ref Range    Acetaminophen (Tylenol), Serum <3.0 (L) 10.0 - 20.0 ug/mL   Urinalysis Microscopic   Result Value Ref Range    Bacteria, UA None None-Occ /hpf    Yeast, UA None None    Hyaline Casts, UA 3 (A) 0-1/lpf /lpf    Microscopic Comment SEE COMMENT          Imaging Results:  Imaging Results     None                  The Emergency Provider reviewed the vital signs and test results, which are outlined above.    ED Discussion       1:37 AM: Re-evaluated pt. AAOx4. D/w pt any concerns expressed at this time. Answered all questions. Pt expresses understanding at this time.    2:31 AM: Reassessed pt at this time. Pt ambulatory without assistance. AAOx4. Discussed with pt all pertinent ED information and results. Discussed pt dx and plan of tx. Gave pt all f/u and return to the ED instructions. All questions and concerns were addressed at this time. Pt expresses understanding of information and instructions, and is comfortable with plan to discharge. Pt  is stable for discharge.    Patient was advised that the treatment of chronic pain, as opposed to the treatment of acute pain, was beyond my scope of practice and was considered a non-emergent, not life threatening, condition. As per LAC 46:XLVII.4513.D.2.b, the practitioner will not prescribe controlled substances in connection with the treatment of chronic or intractable pain defined by LAC 46:XLV.3667-2329, as pain which persists beyond the usual course of a disease, beyond the expected time for healing from bodily trauma, or pain associated with a long-term incurable or intractable medical illness and/or disease. Furthermore, the patient was advised that pursuant to [place] §971, it is considered unlawful for an individual to knowingly or intentionally acquire or obtain possession of a controlled dangerous substance by misrepresentation, fraud, forgery, deception, or subterfuge; to obtain or attempt to obtain a prescription for a controlled substance and/or legend drug by fraud, theft, misrepresentation, deception or subterfuge; or obtain or seek to obtain any controlled dangerous substance or a prescription for a controlled dangerous substance from a healthcare practitioner, while being supplied with any controlled dangerous substance or a prescription for a controlled dangerous substance by another healthcare practitioner, without disclosing the fact of the existing prescription to the practitioner from whom the subsequent prescription for a controlled dangerous substance is sought.     Regarding DRUG ADDICTION, the patient was advised that:  an overdose of controlled substances, illegal drugs, or narcotics, can lead to a coma and death; HIV, AIDS, and hepatitis B and C are serious diseases that can be spread through needles, syringes, and other supplies used to inject narcotics; one may also get an infection if injecting narcotics using dirty needles or supplies; illegal narcotics may be mixed with things like  talcum powder, baking soda, or poisons that may get stuck in veins and cause infections or clots that may travel to the heart, lungs, or brain and cause death; snorting or sniffing heroin may cause a hole to develop in the cartilage that separates the two sides of the nose; using pure heroin  increases the risks of an overdose and death; and black tar heroin may contain the bacteria that causes botulism which can be life-threatening.    ED Medication(s):  Medications - No data to display    Discharge Medication List as of 2/11/2017  2:37 AM          Follow-up Information     Follow up with [facility]. Schedule an appointment as soon as possible for a visit in 1 week.    Contact information:    [address] [facility] [address] [phone]          Follow up with [facility] - .    Specialty:  Emergency Medicine    Why:  As needed, If symptoms worsen    Contact information:    [address] [facility] [address] [phone]            Medical Decision Making    Medical Decision Making:   Clinical Tests:   Lab Tests: Ordered and Reviewed           Scribe Attestation:   Scribe #1: I performed the above scribed service and the documentation accurately describes the services I performed. I attest to the accuracy of the note.    Attending:   Physician Attestation Statement for Scribe #1: I, Yeison Lu Jr., MD, personally performed the services described in this documentation, as scribed by Danielle Emerson, in my presence, and it is both accurate and complete.          Clinical Impression       ICD-10-CM ICD-9-CM   1. Opioid abuse with intoxication F11.129 305.50   2. Polysubstance abuse F19.10 305.90   3. Hypoxia R09.02 799.02       Disposition:   Disposition: Discharged  Condition: Stable         Yeison Lu Jr., MD  02/11/17 0624

## 2018-02-23 ENCOUNTER — CLINICAL SUPPORT (OUTPATIENT)
Dept: OCCUPATIONAL MEDICINE | Facility: CLINIC | Age: 28
End: 2018-02-23

## 2018-02-23 DIAGNOSIS — Z00.00 PHYSICAL EXAM: Primary | ICD-10-CM

## 2018-02-23 LAB
CTP QC/QA: YES
POC 5 PANEL DRUG SCREEN: ABNORMAL

## 2018-02-23 PROCEDURE — 80305 DRUG TEST PRSMV DIR OPT OBS: CPT | Mod: QW,S$GLB,, | Performed by: PHYSICIAN ASSISTANT
